# Patient Record
Sex: FEMALE | Race: WHITE | HISPANIC OR LATINO | URBAN - METROPOLITAN AREA
[De-identification: names, ages, dates, MRNs, and addresses within clinical notes are randomized per-mention and may not be internally consistent; named-entity substitution may affect disease eponyms.]

---

## 2018-06-27 ENCOUNTER — APPOINTMENT (RX ONLY)
Dept: URBAN - METROPOLITAN AREA CLINIC 166 | Facility: CLINIC | Age: 38
Setting detail: DERMATOLOGY
End: 2018-06-27

## 2018-06-27 DIAGNOSIS — Z71.89 OTHER SPECIFIED COUNSELING: ICD-10-CM

## 2018-06-27 DIAGNOSIS — L81.4 OTHER MELANIN HYPERPIGMENTATION: ICD-10-CM

## 2018-06-27 DIAGNOSIS — L57.8 OTHER SKIN CHANGES DUE TO CHRONIC EXPOSURE TO NONIONIZING RADIATION: ICD-10-CM

## 2018-06-27 DIAGNOSIS — D22 MELANOCYTIC NEVI: ICD-10-CM

## 2018-06-27 DIAGNOSIS — Z12.83 ENCOUNTER FOR SCREENING FOR MALIGNANT NEOPLASM OF SKIN: ICD-10-CM

## 2018-06-27 PROBLEM — D22.5 MELANOCYTIC NEVI OF TRUNK: Status: ACTIVE | Noted: 2018-06-27

## 2018-06-27 PROBLEM — D48.5 NEOPLASM OF UNCERTAIN BEHAVIOR OF SKIN: Status: ACTIVE | Noted: 2018-06-27

## 2018-06-27 PROCEDURE — ? BIOPSY BY SHAVE METHOD

## 2018-06-27 PROCEDURE — 99203 OFFICE O/P NEW LOW 30 MIN: CPT | Mod: 25

## 2018-06-27 PROCEDURE — ? COUNSELING

## 2018-06-27 PROCEDURE — 11100: CPT

## 2018-06-27 ASSESSMENT — LOCATION DETAILED DESCRIPTION DERM
LOCATION DETAILED: RIGHT DISTAL POSTERIOR UPPER ARM
LOCATION DETAILED: LEFT DISTAL POSTERIOR UPPER ARM
LOCATION DETAILED: LEFT INFERIOR CENTRAL MALAR CHEEK
LOCATION DETAILED: RIGHT PROXIMAL POSTERIOR UPPER ARM
LOCATION DETAILED: LEFT MEDIAL SUPERIOR CHEST
LOCATION DETAILED: SUPERIOR THORACIC SPINE
LOCATION DETAILED: LEFT MEDIAL BREAST 10-11:00 REGION
LOCATION DETAILED: LEFT PROXIMAL POSTERIOR UPPER ARM

## 2018-06-27 ASSESSMENT — LOCATION ZONE DERM
LOCATION ZONE: FACE
LOCATION ZONE: ARM
LOCATION ZONE: TRUNK

## 2018-06-27 ASSESSMENT — LOCATION SIMPLE DESCRIPTION DERM
LOCATION SIMPLE: RIGHT POSTERIOR UPPER ARM
LOCATION SIMPLE: CHEST
LOCATION SIMPLE: LEFT POSTERIOR UPPER ARM
LOCATION SIMPLE: LEFT CHEEK
LOCATION SIMPLE: UPPER BACK
LOCATION SIMPLE: LEFT BREAST

## 2018-06-27 NOTE — HPI: SKIN LESION
How Severe Is Your Skin Lesion?: mild
Has Your Skin Lesion Been Treated?: not been treated
Is This A New Presentation, Or A Follow-Up?: Skin Lesion
Which Family Member (Optional)?: Father and brother possibly BCC

## 2023-12-14 ENCOUNTER — APPOINTMENT (RX ONLY)
Dept: URBAN - METROPOLITAN AREA CLINIC 93 | Facility: CLINIC | Age: 43
Setting detail: DERMATOLOGY
End: 2023-12-14

## 2023-12-14 ENCOUNTER — APPOINTMENT (RX ONLY)
Dept: URBAN - METROPOLITAN AREA CLINIC 166 | Facility: CLINIC | Age: 43
Setting detail: DERMATOLOGY
End: 2023-12-14

## 2023-12-14 DIAGNOSIS — L81.1 CHLOASMA: ICD-10-CM

## 2023-12-14 DIAGNOSIS — Z41.9 ENCOUNTER FOR PROCEDURE FOR PURPOSES OTHER THAN REMEDYING HEALTH STATE, UNSPECIFIED: ICD-10-CM

## 2023-12-14 PROCEDURE — ? PRESCRIPTION MEDICATION MANAGEMENT

## 2023-12-14 PROCEDURE — 99203 OFFICE O/P NEW LOW 30 MIN: CPT

## 2023-12-14 PROCEDURE — ? COSMETIC QUOTE

## 2023-12-14 PROCEDURE — ? ADDITIONAL NOTES

## 2023-12-14 PROCEDURE — ? COSMETIC CONSULTATION: HYDRAFACIAL

## 2023-12-14 PROCEDURE — ? COSMETIC CONSULTATION - MICRO-NEEDLING

## 2023-12-14 PROCEDURE — ? COUNSELING

## 2023-12-14 ASSESSMENT — LOCATION ZONE DERM: LOCATION ZONE: FACE

## 2023-12-14 ASSESSMENT — LOCATION DETAILED DESCRIPTION DERM
LOCATION DETAILED: LEFT INFERIOR FOREHEAD
LOCATION DETAILED: RIGHT SUPERIOR LATERAL MALAR CHEEK
LOCATION DETAILED: RIGHT INFERIOR FOREHEAD
LOCATION DETAILED: RIGHT LATERAL MALAR CHEEK
LOCATION DETAILED: LEFT CENTRAL MALAR CHEEK
LOCATION DETAILED: LEFT LATERAL MALAR CHEEK

## 2023-12-14 ASSESSMENT — SEVERITY ASSESSMENT: SEVERITY: MILD, SLIGHTLY DARKER THAN THE SURROUNDING NORMAL SKIN

## 2023-12-14 ASSESSMENT — LOCATION SIMPLE DESCRIPTION DERM
LOCATION SIMPLE: LEFT FOREHEAD
LOCATION SIMPLE: RIGHT CHEEK
LOCATION SIMPLE: LEFT CHEEK
LOCATION SIMPLE: RIGHT FOREHEAD

## 2023-12-14 NOTE — PROCEDURE: PRESCRIPTION MEDICATION MANAGEMENT
Detail Level: Zone
Render In Strict Bullet Format?: No
Initiate Treatment: 4% HQ pads and Retinol 10x

## 2023-12-14 NOTE — PROCEDURE: COSMETIC QUOTE
Misc 1 Free Text Discount (In Dollars- Use Only Numbers And Decimals): 0.00
Laser 13 Units: 0
Include Sales Tax On Anesthesia Fees: No
Face Procedure 3 Free Text Discount (In Dollars- Use Only Numbers And Decimals): 163.46
Anesthesia Fee Units (Optional): 1
Face Procedure 1: PRP + Microneedling
Detail Level: Zone
Include Sales Tax On Implants: Yes
Face Procedure 2 Units: 3
Face Procedure 1 Percentage Discount: 10
Face Procedure 1 Units: 4
Face Procedure 2 Price/Unit (In Dollars- Use Only Numbers And Decimals): 329
Face Procedure 2: Platinum HydraFacial treatment
Face Procedure 3 Percentage Discount: 100
Face Procedure 1 Price/Unit (In Dollars- Use Only Numbers And Decimals): 107
Face Procedure 1 Units: 6
Face Procedure 2: Platinum HydraFacial
Face Procedure 1: Microneedling

## 2023-12-14 NOTE — PROCEDURE: ADDITIONAL NOTES
Render Risk Assessment In Note?: no
Detail Level: Simple
Additional Notes: The patient is present due to concerns of acne scarring and expressed interest in treatments for scar revision. Upon evaluation of their cosmetic concerns I recommended the following treatment plan:\\n\\nPRP +Micorneedling: I informed the patient that platelet rich plasma (PRP) is an excellent addition to our microneedling services. PRP is a concentrate composed of plasma and platelets. The platelets play a vital role in aiding with healing and are rich in growth factors stimulating cell reproduction and tissue regeneration. When paired with microneedling it further promotes efficacy of treatment results and can aid in alleviated downtime. The Process of retrieving their PRP is performed and utilized at the time of service. I reassured the patient that I am a licensed Phlebotomist to perform safe and effective blood draws. With the enrichment of PRP to microneedling treatments it can be expected to have an elevated treatment outcome correcting acne scarring and enlarged pores. The patient was quoted $689/PRP + Microneedling treatment.\\n\\nMicroneedling: The patient was informed that Microneedling is an organic mechanical treatment approach for collage remodeling and promotion of elasticity of the skin. I informed the patient that the treatment creates microscopic injuries into the skin forcing the body's natural healing response to gather growth factors, proteins, and amino acids to the treatment region. This process promotes collagen remodeling and cellular regeneration leading to smoothening and softening of acne scarring. The patient was quoted $329/treatment.\\n\\nPlatinum HydraFacial treatment: The patient was informed that HydraFacial treatments are excellent treatments to receive in between her microneedling treatments. I informed her that the HydraFacial treatment will aid in restoring Hydration of her skin following this drying procedure. I also informed her that the HydraFacial treatment will further exfoliate residual dead skin following her treatments to reveal a refreshed, renewed complexion. We reviewed treatment steps, details, benefits, and realistic expectations of our Platinum HydraFacial treatments. I quoted the patient $329/ [place] HydraFacial treatment.\\n\\nThe patient was recommended to initiate her treatment series with a Platinum HydraFacial and return to the office in 2-4 weeks to begin microneedling treatment. I encouraged her to receive Platinum HydraFacials 2-4 weeks in between each microneedling treatment to promote further efficacy of treatments by creating an ideal environment for change.